# Patient Record
Sex: FEMALE | Race: WHITE
[De-identification: names, ages, dates, MRNs, and addresses within clinical notes are randomized per-mention and may not be internally consistent; named-entity substitution may affect disease eponyms.]

---

## 2018-07-17 ENCOUNTER — HOSPITAL ENCOUNTER (OUTPATIENT)
Dept: HOSPITAL 45 - C.ACU | Age: 63
Setting detail: OBSERVATION
LOS: 1 days | Discharge: HOME | End: 2018-07-18
Attending: SURGERY | Admitting: SURGERY
Payer: COMMERCIAL

## 2018-07-17 VITALS
DIASTOLIC BLOOD PRESSURE: 72 MMHG | SYSTOLIC BLOOD PRESSURE: 137 MMHG | TEMPERATURE: 97.88 F | HEART RATE: 67 BPM | OXYGEN SATURATION: 95 %

## 2018-07-17 VITALS
OXYGEN SATURATION: 96 % | HEART RATE: 48 BPM | TEMPERATURE: 97.52 F | DIASTOLIC BLOOD PRESSURE: 72 MMHG | SYSTOLIC BLOOD PRESSURE: 121 MMHG

## 2018-07-17 VITALS
BODY MASS INDEX: 29.68 KG/M2 | WEIGHT: 200.42 LBS | BODY MASS INDEX: 29.68 KG/M2 | WEIGHT: 200.42 LBS | HEIGHT: 69 IN | HEIGHT: 69 IN

## 2018-07-17 VITALS
SYSTOLIC BLOOD PRESSURE: 130 MMHG | OXYGEN SATURATION: 97 % | DIASTOLIC BLOOD PRESSURE: 75 MMHG | HEART RATE: 51 BPM | TEMPERATURE: 97.34 F

## 2018-07-17 VITALS
HEART RATE: 77 BPM | DIASTOLIC BLOOD PRESSURE: 62 MMHG | TEMPERATURE: 98.24 F | SYSTOLIC BLOOD PRESSURE: 141 MMHG | OXYGEN SATURATION: 96 %

## 2018-07-17 VITALS
TEMPERATURE: 97.88 F | DIASTOLIC BLOOD PRESSURE: 71 MMHG | HEART RATE: 62 BPM | OXYGEN SATURATION: 98 % | SYSTOLIC BLOOD PRESSURE: 119 MMHG

## 2018-07-17 VITALS
OXYGEN SATURATION: 96 % | HEART RATE: 62 BPM | TEMPERATURE: 97.34 F | SYSTOLIC BLOOD PRESSURE: 140 MMHG | DIASTOLIC BLOOD PRESSURE: 72 MMHG

## 2018-07-17 VITALS
TEMPERATURE: 97.7 F | HEART RATE: 45 BPM | SYSTOLIC BLOOD PRESSURE: 145 MMHG | DIASTOLIC BLOOD PRESSURE: 76 MMHG | OXYGEN SATURATION: 97 %

## 2018-07-17 DIAGNOSIS — K80.10: ICD-10-CM

## 2018-07-17 DIAGNOSIS — E66.9: ICD-10-CM

## 2018-07-17 DIAGNOSIS — Z88.5: ICD-10-CM

## 2018-07-17 DIAGNOSIS — E11.9: ICD-10-CM

## 2018-07-17 DIAGNOSIS — K80.44: Primary | ICD-10-CM

## 2018-07-17 DIAGNOSIS — Z79.899: ICD-10-CM

## 2018-07-17 DIAGNOSIS — Z85.3: ICD-10-CM

## 2018-07-17 DIAGNOSIS — E78.5: ICD-10-CM

## 2018-07-17 LAB
BUN SERPL-MCNC: 13 MG/DL (ref 7–18)
CALCIUM SERPL-MCNC: 9.5 MG/DL (ref 8.5–10.1)
CO2 SERPL-SCNC: 30 MMOL/L (ref 21–32)
CREAT SERPL-MCNC: 0.91 MG/DL (ref 0.6–1.2)
EOSINOPHIL NFR BLD AUTO: 273 K/UL (ref 130–400)
GLUCOSE SERPL-MCNC: 110 MG/DL (ref 70–99)
HCT VFR BLD CALC: 39.1 % (ref 37–47)
HGB BLD-MCNC: 13.1 G/DL (ref 12–16)
MCH RBC QN AUTO: 29.9 PG (ref 25–34)
MCHC RBC AUTO-ENTMCNC: 33.5 G/DL (ref 32–36)
MCV RBC AUTO: 89.3 FL (ref 80–100)
PMV BLD AUTO: 10.4 FL (ref 7.4–10.4)
POTASSIUM SERPL-SCNC: 4.6 MMOL/L (ref 3.5–5.1)
RED CELL DISTRIBUTION WIDTH CV: 13.3 % (ref 11.5–14.5)
RED CELL DISTRIBUTION WIDTH SD: 43.3 FL (ref 36.4–46.3)
SODIUM SERPL-SCNC: 141 MMOL/L (ref 136–145)
WBC # BLD AUTO: 5.68 K/UL (ref 4.8–10.8)

## 2018-07-17 NOTE — OPERATIVE REPORT
DATE OF OPERATION:  07/17/2018

 

NAME OF OPERATION:  Laparoscopic cholecystectomy.

 

PREOPERATIVE DIAGNOSIS:  Biliary colic.

 

POSTOPERATIVE DIAGNOSIS:  Biliary colic with chronic cholecystitis.

 

STAFF SURGEON:  Richie James MD

 

ASSISTANT:  Sean Wade PA-C.

 

ANESTHESIA:  General.

 

DESCRIPTION OF PROCEDURE:  The patient was brought in the operating room and

placed on the operating room table in supine position.  Pneumatic stockings,

orogastric tube were placed.  Her abdomen was prepped and draped in usual

fashion.  0.5% plain Marcaine was used to anesthetize all incisions. 

Incision was made above the umbilicus, carrying dissection down to the

fascia, placing a Veress needle producing pneumoperitoneum.  The patient was

placed in reverse Trendelenburg position, rotated to the left.  An 11 mm port

was placed at this level and then under visualization, three 5 mm ports were

placed, 1 cephalad and 2 laterally.  Gallbladder was grasped and retracted. 

The cystic artery was identified, initially it was clipped and transected. 

The cystic duct was identified.  It was clipped next to the gallbladder, then

partially opened.  It was very small.  I was unable to pass a catheter

easily.  I did not feel any stones in the duct.  It was clipped and

transected.  The gallbladder was then dissected away from the liver bed

showing some evidence of scar tissue.  The gallbladder was placed in an

Endobag.  After appropriate hemostasis and irrigation, the Endobag was

removed through the umbilical site.  All ports were then removed.  The fascia

at the umbilicus closed using interrupted 0 Vicryl suture.  Skin

reapproximated using subcuticular 4-0 Monocryl.  The umbilical incision

closed using Dermabond.  The other sites closed using Steri-Strips.  The

patient was transferred to recovery room in stable condition.

 

 

I attest to the content of the Intraoperative Record and any orders documented therein. Any exception
s are noted below.

## 2018-07-17 NOTE — ANESTHESIOLOGY PROGRESS NOTE
Anesthesia Post Op Note


Date & Time


Jul 17, 2018 at 14:47





Vital Signs


Pain Intensity:  2





Vital Signs Past 12 Hours








  Date Time  Temp Pulse Resp B/P (MAP) Pulse Ox O2 Delivery O2 Flow Rate FiO2


 


7/17/18 14:40 36.1 48 17 159/68 96 Nasal Cannula 2 


 


7/17/18 14:30  47 15 156/73 97 Nasal Cannula 2 


 


7/17/18 14:20  46 13 128/71 98 Oxymask 10 


 


7/17/18 14:10  47 21 125/75 98 Oxymask 10 


 


7/17/18 14:02 36.1 54 20 175/69 98 Oxymask 10 


 


7/17/18 11:11 36.8 77 16 141/62 (88) 96 Room Air  











Notes


Mental Status:  alert / awake / arousable, participated in evaluation


Pt Amnestic to Procedure:  Yes


Nausea / Vomiting:  adequately controlled


Pain:  adequately controlled


Airway Patency, RR, SpO2:  stable & adequate


BP & HR:  stable & adequate


Hydration State:  stable & adequate


Anesthetic Complications:  no major complications apparent

## 2018-07-17 NOTE — [facility] OPERATIVE REPORT
Operative Report


Operative Date


Jul 17, 2018.





Pre-Operative Diagnosis





chronic cholecysitis





Post-Operative Diagnosis





chronic cholecysitis





Procedure(s) Performed





Laparoscopic Cholecystectomy





Surgeon


Dr. Richie James





Assistant Surgeon(s)


Sean Arellano PA-C





Estimated Blood Loss


10ML





Findings


small cystic duct





Specimens





Permanent Solution:





A.) Gallbladder and Contents





Drains


None





Anesthesia Type


General





Complication(s)


none





Disposition


Recovery Room / PACU


I attest to the content of the Intraoperative Record and any orders documented 

therein.  Any exceptions are noted below.

## 2018-07-18 VITALS
TEMPERATURE: 97.7 F | SYSTOLIC BLOOD PRESSURE: 113 MMHG | HEART RATE: 55 BPM | DIASTOLIC BLOOD PRESSURE: 67 MMHG | OXYGEN SATURATION: 94 %

## 2018-07-18 VITALS
OXYGEN SATURATION: 96 % | HEART RATE: 69 BPM | SYSTOLIC BLOOD PRESSURE: 118 MMHG | DIASTOLIC BLOOD PRESSURE: 56 MMHG | TEMPERATURE: 97.88 F

## 2018-07-18 VITALS
OXYGEN SATURATION: 94 % | DIASTOLIC BLOOD PRESSURE: 67 MMHG | SYSTOLIC BLOOD PRESSURE: 113 MMHG | TEMPERATURE: 97.7 F | HEART RATE: 55 BPM

## 2018-07-18 VITALS — OXYGEN SATURATION: 94 %

## 2018-07-18 VITALS — OXYGEN SATURATION: 96 %

## 2018-07-18 NOTE — DISCHARGE SUMMARY
PRINCIPAL DIAGNOSIS:  Chronic cholecystitis.

 

PROCEDURES:  The patient underwent laparoscopic cholecystectomy.

 

HISTORY OF PRESENT ILLNESS:  The patient is a 62-year-old female who has been

having what appears to be biliary colic.  She had an ultrasound showing

stones and sludge.

 

HOSPITAL COURSE:  The patient was brought in the hospital on 07/17/2018 where

she underwent laparoscopic cholecystectomy.  She tolerated the procedure very

well and is felt stable for discharge home today to be followed in the

surgical clinic within 1-2 weeks.

## 2024-10-01 NOTE — DISCHARGE INSTRUCTIONS
Discharge Instructions


Date of Service


Jul 17, 2018.





Admission


Reason for Admission:  Biliary Colic





Discharge


Discharge Diagnosis / Problem:  chronic cholecystitis





Discharge Goals


Goal(s):  Decrease discomfort, Improve function, Improve disease control





Activity Recommendations


Activity Limitations:  as noted below


Lifting Limitations:  no more than 25 pounds


Exercise/Sports Limitations:  until after follow-up appointment


May Resume Sexual Activity:  when tolerated


Shower/Bathe:  keep incision dry (may shower over incisions on Thur 7/19)





.





Instructions / Follow-Up


Instructions / Follow-Up





SPECIAL CARE INSTRUCTIONS:





*  Cover incisions and change daily for comfort/drainage. may leave uncovered 

with dermabond and if


      steri strips are dry





* Leave steri strips in place





*  May use ibuprofen for pain as tolerated.





*  Expect some swelling and bruising.





Call your doctor if:





*  Temperature above 101 degrees





*  Pain not relieved by pain medicine ordered





*  There is increased drainage or redness from any incision





*  You have any unanswered questions or concerns [phone].








FOLLOW UP VISIT:





If not already scheduled, please call the office for a follow-up visit.





when scheduled- no sutures to remove





OFFICE PHONE NUMBER:





Dr. James Office Phone Number:  [phone]








Current Hospital Diet


Patient's current hospital diet: Regular Diet





Discharge Diet


Recommended Diet:  Regular Diet





Procedures


Procedures Performed:  


Laparoscopic Cholecystectomy





Pending Studies


Studies pending at discharge:  no





Medical Emergencies








.


Who to Call and When:





Medical Emergencies:  If at any time you feel your situation is an emergency, 

please call 911 immediately.





.





Non-Emergent Contact


Non-Emergency issues call your:  Primary Care Provider, Surgeon


.








"Provider Documentation" section prepared by Richie James.








. n/a